# Patient Record
Sex: FEMALE | Race: BLACK OR AFRICAN AMERICAN | NOT HISPANIC OR LATINO | Employment: FULL TIME | URBAN - METROPOLITAN AREA
[De-identification: names, ages, dates, MRNs, and addresses within clinical notes are randomized per-mention and may not be internally consistent; named-entity substitution may affect disease eponyms.]

---

## 2024-03-22 ENCOUNTER — APPOINTMENT (OUTPATIENT)
Dept: RADIOLOGY | Facility: HOSPITAL | Age: 24
End: 2024-03-22
Payer: MEDICAID

## 2024-03-22 ENCOUNTER — HOSPITAL ENCOUNTER (EMERGENCY)
Facility: HOSPITAL | Age: 24
Discharge: HOME | End: 2024-03-22
Payer: MEDICAID

## 2024-03-22 VITALS
BODY MASS INDEX: 39.55 KG/M2 | HEIGHT: 67 IN | RESPIRATION RATE: 16 BRPM | OXYGEN SATURATION: 98 % | SYSTOLIC BLOOD PRESSURE: 135 MMHG | TEMPERATURE: 97.9 F | DIASTOLIC BLOOD PRESSURE: 88 MMHG | WEIGHT: 252 LBS | HEART RATE: 80 BPM

## 2024-03-22 DIAGNOSIS — M25.551 RIGHT HIP PAIN: ICD-10-CM

## 2024-03-22 DIAGNOSIS — M54.41 ACUTE BILATERAL LOW BACK PAIN WITH RIGHT-SIDED SCIATICA: Primary | ICD-10-CM

## 2024-03-22 PROCEDURE — 72100 X-RAY EXAM L-S SPINE 2/3 VWS: CPT

## 2024-03-22 PROCEDURE — 72100 X-RAY EXAM L-S SPINE 2/3 VWS: CPT | Performed by: RADIOLOGY

## 2024-03-22 PROCEDURE — 73502 X-RAY EXAM HIP UNI 2-3 VIEWS: CPT | Mod: RT

## 2024-03-22 PROCEDURE — 73502 X-RAY EXAM HIP UNI 2-3 VIEWS: CPT | Mod: RIGHT SIDE | Performed by: RADIOLOGY

## 2024-03-22 PROCEDURE — 2500000004 HC RX 250 GENERAL PHARMACY W/ HCPCS (ALT 636 FOR OP/ED)

## 2024-03-22 PROCEDURE — 99284 EMERGENCY DEPT VISIT MOD MDM: CPT

## 2024-03-22 PROCEDURE — 2500000005 HC RX 250 GENERAL PHARMACY W/O HCPCS

## 2024-03-22 PROCEDURE — 96372 THER/PROPH/DIAG INJ SC/IM: CPT

## 2024-03-22 RX ORDER — ORPHENADRINE CITRATE 30 MG/ML
60 INJECTION INTRAMUSCULAR; INTRAVENOUS ONCE
Status: COMPLETED | OUTPATIENT
Start: 2024-03-22 | End: 2024-03-22

## 2024-03-22 RX ORDER — LIDOCAINE 560 MG/1
1 PATCH PERCUTANEOUS; TOPICAL; TRANSDERMAL DAILY
Status: DISCONTINUED | OUTPATIENT
Start: 2024-03-22 | End: 2024-03-22 | Stop reason: HOSPADM

## 2024-03-22 RX ORDER — METHOCARBAMOL 500 MG/1
500 TABLET, FILM COATED ORAL 2 TIMES DAILY
Qty: 10 TABLET | Refills: 0 | Status: SHIPPED | OUTPATIENT
Start: 2024-03-22 | End: 2024-03-27

## 2024-03-22 RX ORDER — KETOROLAC TROMETHAMINE 30 MG/ML
30 INJECTION, SOLUTION INTRAMUSCULAR; INTRAVENOUS ONCE
Status: COMPLETED | OUTPATIENT
Start: 2024-03-22 | End: 2024-03-22

## 2024-03-22 RX ORDER — LIDOCAINE 50 MG/G
1 PATCH TOPICAL DAILY
Qty: 5 PATCH | Refills: 0 | Status: SHIPPED | OUTPATIENT
Start: 2024-03-22

## 2024-03-22 RX ADMIN — LIDOCAINE 1 PATCH: 4 PATCH TOPICAL at 19:00

## 2024-03-22 RX ADMIN — ORPHENADRINE CITRATE 60 MG: 60 INJECTION INTRAMUSCULAR; INTRAVENOUS at 19:00

## 2024-03-22 RX ADMIN — KETOROLAC TROMETHAMINE 30 MG: 30 INJECTION, SOLUTION INTRAMUSCULAR at 19:00

## 2024-03-22 ASSESSMENT — PAIN - FUNCTIONAL ASSESSMENT: PAIN_FUNCTIONAL_ASSESSMENT: 0-10

## 2024-03-22 ASSESSMENT — PAIN DESCRIPTION - LOCATION: LOCATION: BACK

## 2024-03-22 ASSESSMENT — PAIN SCALES - GENERAL: PAINLEVEL_OUTOF10: 6

## 2024-03-22 NOTE — ED TRIAGE NOTES
For the last 2 weeks pt has had lower right back and hip pain, yesterday the pain got worse. Tuesday the pt was lightheaded and nauseous, lasted about 5 min and hasn't happened since.

## 2024-03-22 NOTE — Clinical Note
Massiel Becker was seen and treated in our emergency department on 3/22/2024.  She may return to work on 03/25/2024.       If you have any questions or concerns, please don't hesitate to call.      Amanda Coronel PA-C

## 2024-03-22 NOTE — ED PROVIDER NOTES
HPI   Chief Complaint   Patient presents with    Back Pain       Patient is a 23-year-old female with no significant past medical history presenting to the emergency department for evaluation of low back pain and right hip pain.  Patient states pain started 2 weeks ago.  She denies any trauma or injury.  She states the pain is worse when she walks or tries to bend down.  She has tried taking ibuprofen with no relief.  She denies chest pain, shortness of breath, fever, chills, nausea, vomiting, abdominal pain, cough, congestion, headaches, saddle anesthesias, numbness, tingling, bowel or bladder incontinence.                          No data recorded                   Patient History   Past Medical History:   Diagnosis Date    Other specified health status     No pertinent past medical history     Past Surgical History:   Procedure Laterality Date    OTHER SURGICAL HISTORY  02/19/2019    No history of surgery     No family history on file.  Social History     Tobacco Use    Smoking status: Not on file    Smokeless tobacco: Not on file   Substance Use Topics    Alcohol use: Not on file    Drug use: Not on file       Physical Exam   ED Triage Vitals [03/22/24 1833]   Temperature Heart Rate Respirations BP   36.6 °C (97.9 °F) (!) 107 16 135/88      Pulse Ox Temp Source Heart Rate Source Patient Position   100 % Temporal Monitor Sitting      BP Location FiO2 (%)     Left arm --       Physical Exam  GENERAL APPEARANCE: This patient is in no acute respiratory distress. Awake and alert. Talking appropriately. Answering questions appropriately. No evidence of pressured speech.    VITAL SIGNS: As per the nurses' triage record.    HEENT: Normocephalic, atraumatic.    NECK:  full gross ROM during exam    MUSCULOSKELETAL: Tenderness to palpation along the lumbar spine bilaterally specifically in the right sciatic notch.    NEUROLOGICAL: Awake, alert and oriented x 3.    IMMUNOLOGICAL: No palpable lymphadenopathy or lymphatic  streaking noted on visible skin.    DERM: No petechiae, rashes, or ecchymoses on visible skin    PSYCH: mood and affect appear normal.    ED Course & MDM   Diagnoses as of 03/22/24 2227   Acute bilateral low back pain with right-sided sciatica   Right hip pain       Medical Decision Making  **Disclaimer parts of this chart have been completed using voice recognition software. Please excuse any errors of transcription.     Evaluated this patient independently and my supervising physician was available for consultation.    HPI: Detailed above.    Exam: A medically appropriate exam performed, outlined above, given the known history and presentation.    History obtained from: Patient    Labs/Diagnostics:  XR lumbar spine 2-3 views   Final Result   No acute fracture.             MACRO:   None        Signed by: Kaylene Robertson 3/22/2024 7:28 PM   Dictation workstation:   [id number]      XR hip right with pelvis when performed 2 or 3 views   Final Result   No acute fracture.             MACRO:   None        Signed by: Kaylene Robertson 3/22/2024 7:25 PM   Dictation workstation:   [id number]        EMERGENCY DEPARTMENT COURSE and DIFFERENTIAL DIAGNOSIS/MDM:  Patient is a 23-year-old female presenting to the emergency department for evaluation of low back pain and right hip pain.  On physical exam vital signs remarkable for hypertension but otherwise stable patient is in no acute distress.  Patient is tender to palpation along the low back specifically no sciatic notch and has pain with flexion and extension of the low back.  X-ray of the right hip and lumbar spine ordered as well as Toradol, Norflex, and lidocaine patch for pain.  X-ray of the lumbar spine and right hip showed no acute fractures or abnormalities.  Suspect this is musculoskeletal versus sciatic pain.  Patient was discharged with a prescription for lidocaine patches as well as Robaxin.  She was advised to take Tylenol and ibuprofen as well for pain.  She was  "advised to follow-up with primary care physician outpatient within the next 1 to 2 days.  She will return to the emergency department anytime with any new or worsening symptoms.    I utilized an evidence-based risk rating tool (CMT) along with my training and experience to weigh the risk of discharge against the risks of further testing, imaging, or hospitalization. At this time I estimate the risks of additional testing, imaging, or hospitalization to be equal to or greater than the risk of discharge. I discussed my risk assessment with the patient and the patient consents to the risk of discharge as well as the risk of uncertainty in estimating outcomes. Given the symptoms and findings present at this time, the chance of SEA or SCC is so remote that additional testing or imaging is more likely to harm the patient than diagnose SEA. [id number]         Vitals:    Vitals:    03/22/24 1833 03/22/24 1954   BP: 135/88    BP Location: Left arm    Patient Position: Sitting    Pulse: (!) 107 80   Resp: 16    Temp: 36.6 °C (97.9 °F)    TempSrc: Temporal    SpO2: 100% 98%   Weight: 114 kg (252 lb)    Height: 1.7 m (5' 6.93\")      History Limited by:    None    Independent history obtained from:    None      External records reviewed:    None      Diagnostics interpreted by me:    Xrays - see my independent interpretation in MDM      Discussions with other clinicians:    None      Chronic conditions impacting care:    None      Social determinants of health affecting care:    None    Diagnostic tests considered but not performed: None    ED Medications managed:    Medications   ketorolac (Toradol) injection 30 mg (30 mg intramuscular Given 3/22/24 1900)   orphenadrine (Norflex) injection 60 mg (60 mg intramuscular Given 3/22/24 1900)         Prescription drugs considered:    Pain Medications lidocaine patch and robaxin      Procedure  Procedures     Amanda Coronel PA-C  03/22/24 2227    "

## 2024-03-28 ENCOUNTER — APPOINTMENT (OUTPATIENT)
Dept: PLASTIC SURGERY | Facility: CLINIC | Age: 24
End: 2024-03-28
Payer: MEDICAID

## 2024-04-15 ENCOUNTER — OFFICE VISIT (OUTPATIENT)
Dept: PLASTIC SURGERY | Facility: CLINIC | Age: 24
End: 2024-04-15
Payer: MEDICAID

## 2024-04-15 VITALS
HEART RATE: 94 BPM | WEIGHT: 293 LBS | HEIGHT: 67 IN | DIASTOLIC BLOOD PRESSURE: 85 MMHG | BODY MASS INDEX: 45.99 KG/M2 | SYSTOLIC BLOOD PRESSURE: 138 MMHG

## 2024-04-15 DIAGNOSIS — N62 MACROMASTIA: ICD-10-CM

## 2024-04-15 DIAGNOSIS — M54.9 BACK PAIN, UNSPECIFIED BACK LOCATION, UNSPECIFIED BACK PAIN LATERALITY, UNSPECIFIED CHRONICITY: Primary | ICD-10-CM

## 2024-04-15 PROCEDURE — 99204 OFFICE O/P NEW MOD 45 MIN: CPT | Performed by: PHYSICIAN ASSISTANT

## 2024-04-15 PROCEDURE — 3008F BODY MASS INDEX DOCD: CPT | Performed by: PHYSICIAN ASSISTANT

## 2024-04-15 NOTE — PROGRESS NOTES
Department of Plastic and Reconstructive Surgery          Initial Office Consultation    Date: 04/15/24  Primary Care Provider: DO Jessica Valenciaair Becker is a 23 y.o. female who was self-referred for evaluation of upper back and shoulder pain secondary to macromastia.      She presents today to discuss ongoing back and shoulder pain. She endorses daily pain that increases with activity and with working and exercising. She rates her pain at 8/10 at its worst. She endorses bra strap grooving from the weight of her breast. She has attempted conservative measures for her pain including heat/ice, OTC pain medications including tylenol and ibuprofen for her pain without resolution. She has not attempted physical therapy. She additionally notes rashes and skin irritation of the breast folds. She manages these on her own with topical ointments and has not sought medical treatment for these rashes. Her current bra size is 44DDD.       PMH: none  Surgical Hx: tonsillectomy  Family Hx: non-contributory  Smoking: none      Review of Systems   All other systems have been reviewed with the patient and have been found to be negative with exception to the chief complaint as mentioned in the history of present illness.    ROS: As noted in history of present illness    - CONSTITUTIONAL: Denies weight loss, fever and chills.  - HEENT: Denies changes in vision and hearing.  - RESPIRATORY: Denies SOB and cough, difficulty breathing  - CV: Denies palpitations and CP  - GI: Denies abdominal pain, nausea, vomiting and diarrhea.  - : Denies dysuria and urinary frequency.  - MSK:positive for back and shoulder pain  - SKIN: positive for skin irritation and rashes of the breast folds  - NEUROLOGICAL: Denies headache and syncope.      Objective   Vital Signs:   Vitals:    04/15/24 1317   BP: 138/85   Pulse: 94       Gen: interactive and pleasant  Head: NCAT  Eyes: EOMI, PERRLA  Mouth: MMM  Throat: trachea midline  Cor:  RRR  Pulm: nonlabored breathing  Abd: s/nt/nd  Neuro: AAOx3  Ext: extremities perfused    Focused exam of the: breast                                  R                  L  SN:NAC             43cm              41cm  NAC:IMF            20cm             20cm         BW                    15cm              15.5cm        NAC diam         7cm              7.5cm                                                                                                 Ptosis Grade     3               3                                                       Bra Size         44DDD    Body mass index is 49.96 kg/m².  Body surface area is 2.62 meters squared.     Imaging  XR lumbar spine 3/22/24  IMPRESSION:  No acute fracture.        Assessment/Plan     Massiel Becker is a 23 y.o. female who was self-referred for evaluation of upper back and shoulder pain secondary to macromastia.    She did undoubtedly has macromastia which is symptomatic causing her pain, bra strap grooving, trapezial hypertrophy. I am going to refer her to our physical therapy colleagues to address this nonsurgically first. We will have her follow up with our office after completion of physical therapy for reevaluation.     We additionally discussed weight management today. Her BMI today is 49.96. I advised nutrition referral for lifestyle modifications for weight loss. I advised that weight loss main aid in her back pain. We discussed a goal BMI of 35.     Plan:   Referral to physical therapy   Referral to nutrition services  Patient is to arrange follow up after completion of weight loss and physical therapy.     I spent 45 minutes with this patient. Greater than 50% of this time was spent in the counselling and/or coordination of care of this patient.  This note was created using voice recognition software and was not corrected for typographical or grammatical errors.    Signature: Toma Cunningham PA-C  Date: 04/15/24

## 2024-05-08 ENCOUNTER — LAB REQUISITION (OUTPATIENT)
Dept: LAB | Facility: HOSPITAL | Age: 24
End: 2024-05-08
Payer: MEDICAID

## 2024-05-08 DIAGNOSIS — A74.9 CHLAMYDIAL INFECTION, UNSPECIFIED: ICD-10-CM

## 2024-05-08 PROCEDURE — 87102 FUNGUS ISOLATION CULTURE: CPT

## 2024-05-09 ENCOUNTER — APPOINTMENT (OUTPATIENT)
Dept: PHYSICAL THERAPY | Facility: HOSPITAL | Age: 24
End: 2024-05-09
Payer: MEDICAID

## 2024-05-12 LAB — YEAST SPEC QL CULT: ABNORMAL

## 2024-05-17 ENCOUNTER — TELEMEDICINE CLINICAL SUPPORT (OUTPATIENT)
Dept: NUTRITION | Facility: CLINIC | Age: 24
End: 2024-05-17
Payer: MEDICAID

## 2024-05-17 VITALS — WEIGHT: 293 LBS | BODY MASS INDEX: 45.99 KG/M2 | HEIGHT: 67 IN

## 2024-05-17 PROCEDURE — 97802 MEDICAL NUTRITION INDIV IN: CPT | Mod: GT,U1 | Performed by: DIETITIAN, REGISTERED

## 2024-05-17 PROCEDURE — 97802 MEDICAL NUTRITION INDIV IN: CPT | Performed by: DIETITIAN, REGISTERED

## 2024-05-17 NOTE — PROGRESS NOTES
"Reason for Nutrition Visit:  Pt is a 24 y.o. female being seen remotely. Pt was referred byToma Cunningham PA-C  effective y 17, 2024.   1. BMI 45.0-49.9, adult (Multi)  Referral to Nutrition Services           Past Medical Hx:  There is no problem list on file for this patient.       Current Outpatient Medications:     lidocaine (Lidoderm) 5 % patch, Place 1 patch over 12 hours on the skin once daily. Remove & discard patch within 12 hours or as directed by MD., Disp: 5 patch, Rfl: 0    methocarbamol (Robaxin) 500 mg tablet, Take 1 tablet (500 mg) by mouth 2 times a day for 5 days., Disp: 10 tablet, Rfl: 0     Anthropometrics:  Anthropometrics  Height: 170.2 cm (5' 7.01\")  Weight: 145 kg (319 lb)  BMI (Calculated): 49.95   Weight change:    Significant Weight Change: No    Lab Results   Component Value Date    HGBA1C 5.7 (H) 07/06/2021        Chemistry    No results found for: \"NA\", \"K\", \"CL\", \"CO2\", \"BUN\", \"CREATININE\", \"GLU\" No results found for: \"CALCIUM\", \"ALKPHOS\", \"AST\", \"ALT\", \"BILITOT\"      Food and Nutrition Hx:    Pt states she needs to lose wt for breast surgery. Pt needs to get to 223 lbs.   She works 6:00- pm until 7:00 am. Sleeps 8:00-noon on work days.   Pt reports feeling tired upon waking.   The number of meals are dependent on what is felt. There are sometimes there is no appetite and then a ravenous appetite.     24 Diet Recall:  Meal 1: breakfast is skipped   Meal 2: lunch is at 1:00 to include cereal such as 2 cup of of Fruit Loops or Honey bunches of Oats with 1 cup of 2% milk   Snack may be a 2 -3 handful of chips,   Meal 3: Dinner is at 8:00- 9:00 pm to include 2 -3 cups of of chips in a taco salad with 8 ounces of ground beef, 1 ounce of cheese, 3 -4 tablespoons of sour cream with sometime tomatoes and lettuce.   Snacks:  Beverages: 120 ounces of water per day. Once in awhile juice and regular pop.     Allergies: None  Intolerance: None  Appetite: Good  Intake: >75%  GI Symptoms : None " "  Swallowing Difficulty: No problems with swallowing  Dentition : own    Types of Activities: Mostly Sedentary    Sleep duration/quality : 5-6 hours and disrupted sleep  Sleep disorders: none    Supplements: Denies     Feelings of Hunger?: Doesn't notice  Physical Feeling: Physically full, Stuffed, Sluggish, and Varies depending on meal consumed  Binging: Never  Cravings: None  Energy Levels: Fluctuates    Food Preparation: Patient  Cooking Skills/Barriers: None reported  Grocery Shopping: Patient    Eating Out Type: Denies/Unknown     Nutrition Focused Physical Exam:    Performed/Deferred: Deferred due to be being virtual visit    Estimated Energy Needs:  Energy Needs  Calculated Energy Needs Using Equations  Height: 170.2 cm (5' 7.01\")  Weight Used for Equation Calculations: 145 kg (319 lb)  Yordan Hayward Equation (Overweight or Obese Patients): 2230  Equation Chosen to Use by RD: Demar Pope  Activity Factor: 1.2  Total Energy Needs: 2676  Estimated Energy Needs  Total Energy Estimated Needs (kCal): 2176 kCal     Nutrition Diagnosis:  Malnutrition Diagnosis     Patient has Malnutrition Diagnosis No   Nutrition Diagnosis     Patient has Nutrition Diagnosis Yes   Diagnosis Status (1) New   Nutrition Diagnosis 1 Food and nutrition related knowledge deficit   Related to (1) how to eat for wt loss   As Evidenced by (1) reports by pt of the need to learn.   Additional Nutrition Diagnosis Diagnosis 2   Diagnosis Status (2) New   Nutrition Diagnosis 2 Excessive energy intake   Related to (2) selection of high calorie foods and larger portions   As Evidenced by (2) kcal intake does not meet daily needs.   Diagnosis Status (3) New   Nutrition Diagnosis 3 Altered nutrition related to laboratory values   Related to (3) lack of meal and snack schedule and structure in the presence of pre-diabetes   As Evidenced by (3) recurrent A1c is at 5.7%.     Nutrition Interventions:  Medical nutrition therapy was given for wt " loss.   Nutrition Counseling: Motivational Interviewing and CBT  Coordination of Care: None    Nutrition Recommendations:   2176 calories per day for 1 lb wt loss per week. CHO consistent meal plan with aim for 45 grams of CHO at meals and 15 grams at snacks to reduce A1c.    Nutrition Goals:   Via teach back method patient verbalized understanding of the following topics:  1) Aim for three meals and 1 -2 snacks per day. Eating three meals per day can increase the metabolism, this is called the thermal effect of eating. Eating three meals burns more calories than two meals consumed per day. Strive to consume breakfast within 1 -2 hours of waking to jump start the metabolism.  On work days,  aim for breakfast by 1:00 pm, lunch at 6:00- 7:00 pm, dinner at midnight,  and snack at 5:00- 6:00 am.   Non-work days, strive to consume breakfast within a hour of waking, lunch 4 -5 hours after breakfast and dinner 4 -5 hours after lunch .   2) Strive to include protein at the meals and even snacks. Protein at meals can increase the metabolism too.  Protein at snacks can sustain energy, stabilize blood glucose, and provide more contentment. Protein foods include eggs, egg whites, cheese, nuts, nut butters, Greek yogurt, poultry, meat, fish, tofu, plant-based protein powder, and/or plant-based protein drinks.   3) The recommendation for exercise and health is to participate in 150 minutes of moderate exercise per week. Consider walking 5 days a week for 30 minutes or more. Moderate exercise means that there is an increase in heart rate yet a conversation could still be participated in. If walking for this time is not feasible then consider use of pedometer with aim for 7,000- 10,000 steps to meet this exercise recommendation.     Educational Handouts: Metabolism and Wt Loss     Cynthia Key, MS, RDN, CLEMENTE, NADJA   Advanced Practice Clinical Dietitian  [email or web address]  Schedule line [phone]  Direct line  [phone]     Readiness to Change : Good  Level of Understanding : Good  Anticipated Compliant : Good

## 2024-06-26 NOTE — PATIENT INSTRUCTIONS
It was nice meeting you today.    Please go to [facility] lab or another [facility] lab facility to complete the lab testing that I ordered . I ordered a Tspot to screen for TB instead of of ppd skin testing.     Please call radiology to schedule thyroid US.        Please call referral line to schedule GYN appointment for pap smear and well woman visit     I recommend receiving the TDAP booster that prevents tetanus and other infectious disease. You received the TDAP today.    I recommend the updated COVID vaccine that can be obtained at your local pharmacy    Please schedule a follow up with me in 4  weeks to discuss and review your test results

## 2024-06-27 ENCOUNTER — OFFICE VISIT (OUTPATIENT)
Dept: PRIMARY CARE | Facility: CLINIC | Age: 24
End: 2024-06-27
Payer: MEDICAID

## 2024-06-27 VITALS
WEIGHT: 293 LBS | HEART RATE: 96 BPM | BODY MASS INDEX: 45.99 KG/M2 | TEMPERATURE: 97.6 F | HEIGHT: 67 IN | DIASTOLIC BLOOD PRESSURE: 70 MMHG | OXYGEN SATURATION: 99 % | SYSTOLIC BLOOD PRESSURE: 122 MMHG

## 2024-06-27 DIAGNOSIS — Z11.1 SCREENING-PULMONARY TB: ICD-10-CM

## 2024-06-27 DIAGNOSIS — Z76.89 ENCOUNTER TO ESTABLISH CARE: ICD-10-CM

## 2024-06-27 DIAGNOSIS — Z13.1 DIABETES MELLITUS SCREENING: ICD-10-CM

## 2024-06-27 DIAGNOSIS — E66.01 CLASS 3 SEVERE OBESITY WITHOUT SERIOUS COMORBIDITY WITH BODY MASS INDEX (BMI) OF 45.0 TO 49.9 IN ADULT, UNSPECIFIED OBESITY TYPE (MULTI): ICD-10-CM

## 2024-06-27 DIAGNOSIS — Z11.3 ROUTINE SCREENING FOR STI (SEXUALLY TRANSMITTED INFECTION): ICD-10-CM

## 2024-06-27 DIAGNOSIS — E04.9 ENLARGED THYROID: ICD-10-CM

## 2024-06-27 DIAGNOSIS — Z71.85 IMMUNIZATION COUNSELING: ICD-10-CM

## 2024-06-27 DIAGNOSIS — Z00.00 PHYSICAL EXAM: Primary | ICD-10-CM

## 2024-06-27 DIAGNOSIS — Z13.220 LIPID SCREENING: ICD-10-CM

## 2024-06-27 DIAGNOSIS — Z12.4 CERVICAL CANCER SCREENING: ICD-10-CM

## 2024-06-27 DIAGNOSIS — Z11.59 ENCOUNTER FOR HEPATITIS C SCREENING TEST FOR LOW RISK PATIENT: ICD-10-CM

## 2024-06-27 DIAGNOSIS — E55.9 VITAMIN D DEFICIENCY: ICD-10-CM

## 2024-06-27 DIAGNOSIS — Z91.89 AT RISK FOR SEXUALLY TRANSMITTED DISEASE DUE TO UNPROTECTED SEX: ICD-10-CM

## 2024-06-27 DIAGNOSIS — J45.909 MILD ASTHMA, UNSPECIFIED WHETHER COMPLICATED, UNSPECIFIED WHETHER PERSISTENT (HHS-HCC): ICD-10-CM

## 2024-06-27 DIAGNOSIS — Z23 ENCOUNTER FOR ADMINISTRATION OF VACCINE: ICD-10-CM

## 2024-06-27 PROCEDURE — 90715 TDAP VACCINE 7 YRS/> IM: CPT | Performed by: FAMILY MEDICINE

## 2024-06-27 PROCEDURE — 99385 PREV VISIT NEW AGE 18-39: CPT | Performed by: FAMILY MEDICINE

## 2024-06-27 PROCEDURE — 1036F TOBACCO NON-USER: CPT | Performed by: FAMILY MEDICINE

## 2024-06-27 PROCEDURE — 3008F BODY MASS INDEX DOCD: CPT | Performed by: FAMILY MEDICINE

## 2024-06-27 RX ORDER — ALBUTEROL SULFATE 90 UG/1
2 AEROSOL, METERED RESPIRATORY (INHALATION) EVERY 4 HOURS PRN
Qty: 8 G | Refills: 11 | Status: SHIPPED | OUTPATIENT
Start: 2024-06-27 | End: 2025-06-27

## 2024-06-27 ASSESSMENT — PATIENT HEALTH QUESTIONNAIRE - PHQ9
SUM OF ALL RESPONSES TO PHQ9 QUESTIONS 1 AND 2: 0
1. LITTLE INTEREST OR PLEASURE IN DOING THINGS: NOT AT ALL
2. FEELING DOWN, DEPRESSED OR HOPELESS: NOT AT ALL

## 2024-06-27 ASSESSMENT — PAIN SCALES - GENERAL: PAINLEVEL: 0-NO PAIN

## 2024-06-27 ASSESSMENT — COLUMBIA-SUICIDE SEVERITY RATING SCALE - C-SSRS
2. HAVE YOU ACTUALLY HAD ANY THOUGHTS OF KILLING YOURSELF?: NO
1. IN THE PAST MONTH, HAVE YOU WISHED YOU WERE DEAD OR WISHED YOU COULD GO TO SLEEP AND NOT WAKE UP?: NO
6. HAVE YOU EVER DONE ANYTHING, STARTED TO DO ANYTHING, OR PREPARED TO DO ANYTHING TO END YOUR LIFE?: NO

## 2024-09-20 ENCOUNTER — OFFICE VISIT (OUTPATIENT)
Dept: URGENT CARE | Age: 24
End: 2024-09-20
Payer: COMMERCIAL

## 2024-09-20 VITALS
HEIGHT: 67 IN | HEART RATE: 80 BPM | OXYGEN SATURATION: 97 % | DIASTOLIC BLOOD PRESSURE: 84 MMHG | WEIGHT: 293 LBS | BODY MASS INDEX: 45.99 KG/M2 | TEMPERATURE: 97.9 F | SYSTOLIC BLOOD PRESSURE: 126 MMHG

## 2024-09-20 DIAGNOSIS — R11.0 NAUSEA: ICD-10-CM

## 2024-09-20 DIAGNOSIS — R19.7 DIARRHEA, UNSPECIFIED TYPE: ICD-10-CM

## 2024-09-20 DIAGNOSIS — R10.13 EPIGASTRIC PAIN: Primary | ICD-10-CM

## 2024-09-20 DIAGNOSIS — K59.00 CONSTIPATION, UNSPECIFIED CONSTIPATION TYPE: ICD-10-CM

## 2024-09-20 LAB — PREGNANCY TEST URINE, POC: NEGATIVE

## 2024-09-20 RX ORDER — ONDANSETRON 4 MG/1
4 TABLET, ORALLY DISINTEGRATING ORAL EVERY 8 HOURS PRN
Qty: 10 TABLET | Refills: 0 | Status: SHIPPED | OUTPATIENT
Start: 2024-09-20

## 2024-09-20 RX ORDER — FAMOTIDINE 40 MG/1
40 TABLET, FILM COATED ORAL DAILY
Qty: 14 TABLET | Refills: 0 | Status: SHIPPED | OUTPATIENT
Start: 2024-09-20 | End: 2024-10-04

## 2024-09-20 ASSESSMENT — ENCOUNTER SYMPTOMS
CONSTIPATION: 1
VOMITING: 0
HEMATURIA: 0
FEVER: 0
CHILLS: 0
SHORTNESS OF BREATH: 0
DIARRHEA: 1
DIAPHORESIS: 0
ABDOMINAL PAIN: 1
NAUSEA: 1
DYSURIA: 0
FREQUENCY: 0
BLOOD IN STOOL: 0

## 2024-09-20 NOTE — LETTER
September 20, 2024     Patient: Massiel Becker   YOB: 2000   Date of Visit: 9/20/2024       To Whom It May Concern:    Massiel Becker was seen in my clinic on 9/20/2024. Please excuse Massiel for her absence from work as follows: 9/20/2024-09/21/2024. Returning 9/22/2024.    If you have any questions or concerns, please don't hesitate to call.         Sincerely,         Shital Alas PA-C        CC: No Recipients

## 2024-09-20 NOTE — PATIENT INSTRUCTIONS
Take famotidine once a day.    Take miralax twice a day for 2 days then once a day until having several soft formed stools a day.    Go to emergency department for worsening symptoms or concerns.     Follow up with primary care if no improvement in 3 - 4 days.

## 2024-09-20 NOTE — PROGRESS NOTES
Subjective   Patient ID: Massiel Becker is a 24 y.o. female. They present today with a chief complaint of Diarrhea, Nausea, and Abdominal Pain (Since 9/16. Diarrhea has worsened. ).    History of Present Illness  Diarrhea (am only, increased today), constipation (one week ago), cramping abd pain, nausea started Monday.    Abd pain comes and goes, last 1hour approx. 3x/day, not affected by food.    Denies fever, chills, sweats, vomiting, chest pain, SOB, bleeding, urinary symptoms.       Diarrhea  Associated symptoms: abdominal pain    Associated symptoms: no chills, no diaphoresis, no fever and no vomiting    Abdominal Pain  Associated symptoms include constipation, diarrhea and nausea. Pertinent negatives include no dysuria, fever, frequency, hematuria or vomiting.       Past Medical History  Allergies as of 09/20/2024 - Reviewed 09/20/2024   Allergen Reaction Noted    Iodine Unknown, Shortness of breath, and Swelling 10/16/2023       (Not in a hospital admission)       Past Medical History:   Diagnosis Date    Other specified health status     No pertinent past medical history       Past Surgical History:   Procedure Laterality Date    OTHER SURGICAL HISTORY  02/19/2019    No history of surgery    TONSILLECTOMY          reports that she has never smoked. She has never been exposed to tobacco smoke. She has never used smokeless tobacco. She reports that she does not drink alcohol and does not use drugs.    Review of Systems  Review of Systems   Constitutional:  Negative for chills, diaphoresis and fever.   Respiratory:  Negative for shortness of breath.    Cardiovascular:  Negative for chest pain.   Gastrointestinal:  Positive for abdominal pain, constipation, diarrhea and nausea. Negative for blood in stool and vomiting.   Genitourinary:  Negative for dysuria, frequency and hematuria.   All other systems reviewed and are negative.                                 Objective    Vitals:    09/20/24 1333   BP: 126/84  "  BP Location: Left arm   Patient Position: Sitting   BP Cuff Size: Large adult   Pulse: 80   Temp: 36.6 °C (97.9 °F)   TempSrc: Oral   SpO2: 97%   Weight: 143 kg (315 lb)   Height: 1.702 m (5' 7\")     Patient's last menstrual period was 08/29/2024 (approximate).    Physical Exam  Vitals and nursing note reviewed.   Constitutional:       General: She is not in acute distress.  Cardiovascular:      Rate and Rhythm: Normal rate and regular rhythm.      Heart sounds: Normal heart sounds.   Pulmonary:      Effort: Pulmonary effort is normal.      Breath sounds: Normal breath sounds.   Abdominal:      Palpations: Abdomen is soft.      Tenderness: There is abdominal tenderness. There is no guarding or rebound.      Comments: Tender mid epigastric   Neurological:      Mental Status: She is alert.         Procedures    Point of Care Test & Imaging Results from this visit  No results found for this visit on 09/20/24.   No results found.    Diagnostic study results (if any) were reviewed by Shital Alas PA-C.    Assessment/Plan   Allergies, medications, history, and pertinent labs/EKGs/Imaging reviewed by Shital Alas PA-C.         Orders and Diagnoses  There are no diagnoses linked to this encounter.    Medical Admin Record      Patient disposition: Home    Electronically signed by Shital Alas PA-C  1:47 PM      "